# Patient Record
Sex: MALE
[De-identification: names, ages, dates, MRNs, and addresses within clinical notes are randomized per-mention and may not be internally consistent; named-entity substitution may affect disease eponyms.]

---

## 2024-04-29 ENCOUNTER — APPOINTMENT (OUTPATIENT)
Dept: ORTHOPEDIC SURGERY | Facility: CLINIC | Age: 74
End: 2024-04-29
Payer: COMMERCIAL

## 2024-04-29 VITALS — WEIGHT: 175 LBS | BODY MASS INDEX: 26.52 KG/M2 | HEIGHT: 68 IN | RESPIRATION RATE: 16 BRPM

## 2024-04-29 DIAGNOSIS — M72.0 PALMAR FASCIAL FIBROMATOSIS [DUPUYTREN]: ICD-10-CM

## 2024-04-29 DIAGNOSIS — Z78.9 OTHER SPECIFIED HEALTH STATUS: ICD-10-CM

## 2024-04-29 DIAGNOSIS — I10 ESSENTIAL (PRIMARY) HYPERTENSION: ICD-10-CM

## 2024-04-29 PROBLEM — Z00.00 ENCOUNTER FOR PREVENTIVE HEALTH EXAMINATION: Status: ACTIVE | Noted: 2024-04-29

## 2024-04-29 PROCEDURE — 99204 OFFICE O/P NEW MOD 45 MIN: CPT

## 2024-04-29 PROCEDURE — 73120 X-RAY EXAM OF HAND: CPT | Mod: 50

## 2024-04-29 RX ORDER — METOPROLOL SUCCINATE 25 MG/1
25 TABLET, EXTENDED RELEASE ORAL
Refills: 0 | Status: ACTIVE | COMMUNITY

## 2024-04-29 RX ORDER — AMLODIPINE BESYLATE VALSARTAN HYDROCHLOROTHIAZIDE 5; 25; 160 MG/1; MG/1; MG/1
5-160-25 TABLET, FILM COATED ORAL
Refills: 0 | Status: ACTIVE | COMMUNITY

## 2024-04-29 RX ORDER — EZETIMIBE 10 MG/1
TABLET ORAL
Refills: 0 | Status: ACTIVE | COMMUNITY

## 2024-04-29 RX ORDER — PRIMIDONE 50 MG/1
TABLET ORAL
Refills: 0 | Status: ACTIVE | COMMUNITY

## 2024-04-29 RX ORDER — ATORVASTATIN CALCIUM 80 MG/1
TABLET, FILM COATED ORAL
Refills: 0 | Status: ACTIVE | COMMUNITY

## 2024-04-29 NOTE — PHYSICAL EXAM
[de-identified] : On exam the flexor tendons to the small finger are intact as well as the ring finger.  There is a pretendinous recurrent cord to the small finger with a 40 degree MP contracture and a positive tabletop test.  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  There is a goalpost type incision in the palm.  Has a cystic mass volar radial wrist that is nontender on the left.  On the right side he has a large Dupuytren's nodule below the middle finger without a contracture.  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

## 2024-04-29 NOTE — ASSESSMENT
[FreeTextEntry1] : My impression is that the patient has recurrent left small finger Dupuytren's disease.  Likely has Dupuytren's diathesis with bilateral disease, as well as knuckle pads.  No known family history however.  We discussed options and I recommend if he would like to treat this I would recommend starting with left small finger MP cord Xiaflex injection  The risks benefits and alternatives were discussed with the patient which included, but were not limited to infection, nerve injury, tendon ruptures, CRPS, recurrence, skin tears, allergic reaction, lymphadenopathy, need for additional surgery or treatment, pain etc.

## 2024-04-29 NOTE — HISTORY OF PRESENT ILLNESS
[Right] : right hand dominant [FreeTextEntry1] : Patient presents today for evaluation of left Dupuytren contracture of the small finger and right Dupuytren's disease. Patient states that he had surgery done for the left Dupuytren's contracture last two years ago with no relief. He reports that he noticed the growth of the cords in the right palm about a year ago. No injuries.  Had surgery by Berto Delgadillo approximately 2 years ago